# Patient Record
(demographics unavailable — no encounter records)

---

## 2022-07-27 LAB
INFLUENZA A: NOT DETECTED
INFLUENZA B: NOT DETECTED
SARS-COV-2: NOT DETECTED

## 2022-10-19 NOTE — DISCHARGE SUMMARY
ED Clinical Summary                         [facility]  [address]  [place], [place], [address] [phone]           PERSON INFORMATION  Name: Jesus Ernandez Age:  24 Years : 2001   Sex: Male Language: English PCP: PCP,  NONE   Marital Status:  Single Phone: [phone] Med Service: MED-Medicine   MRN:  [id number] Acct# [de-identified] Arrival: 2022 21:36:00   Visit Reason: Weakness; Nausea; SORE THROAT/LIGHT HEADED/POSS COVID + Acuity: 3 LOS: 000 01:17   Address:      [facility] [address] [place] [address]  Diagnosis:      Viral illness  Printed Prescriptions: Allergies      No Known Allergies      Medications Administered During Visit:        Patient Medication List:              famotidine (Pepcid 20 mg oral tablet) 1 Tabs Oral (given by mouth) 2 times a day for 7 Days. Refills: 0.  naproxen (Naprosyn 500 mg oral tablet) 1 Tabs Oral (given by mouth) 2 times a day for 7 Days. Refills: 0.  omeprazole (PriLOSEC 20 mg oral delayed release capsule) 1 Capsules Oral (given by mouth) every day. Refills: 0.  ondansetron (Zofran ODT 4 mg oral tablet, disintegrating) 1 Tabs Oral (given by mouth) 3 times a day for 3 Days. Refills: 0. Major Tests and Procedures: The following procedures and tests were performed during your ED visit. COMMONPROCEDURES%>  COMMON PROCEDURESCOMMENTS%>          Laboratory Orders  Name Status Details   [facility] Completed Nasopharyngeal Swab, Stat, ST - Stat, 22 21:42:00 EDT, 22 21:42:00 EDT, Nurse collect, MAGED Hampton, Arcelia OVIEDO, Print label Y/N               Radiology Orders  No radiology orders were placed. Patient Care Orders  Name Status Details   COVID-19 Status Ordered 22 21:42:13 EDT, NOT VALID FOR pharmacy, laboratory, radiology. , 22 21:42:13 EDT, COVID-19 Not Detected   DC ISO Order/Icons Ordered 22 22:38:37 EDT, 22 22:38:37 EDT   Discharge Patient Ordered INFORMATION  Instructions:       Viral Syndrome, No antibiotics (CUSTOM)     Follow up:                    With: Address: When:   Follow up with primary care provider  Within 1 week           ED PROVIDER DOCUMENTATION

## 2022-10-19 NOTE — ED NOTES
ED Triage Note       ED Secondary Triage Entered On:  7/27/2022 22:22 EDT    Performed On:  7/27/2022 22:22 EDT by Juhi Hoang RN, Ene Hall               General Information   Barriers to Learning :   None evident   Languages :   English   Pt. Currently Receiving Radiation :   No   ED Home Meds Section :   Document assessment   Anabel Dodge ED Fall Risk Section :   Document assessment   ED History Section :   Document assessment   ED Advance Directives Section :   Document assessment   ED Palliative Screen :   N/A (prefilled for <64yo)   Page Self RN, Flor Barrios B - 7/27/2022 22:22 EDT   (As Of: 7/27/2022 22:22:53 EDT)   Problems(Active)    Chest pain (SNOMED CT  :83721591 )  Name of Problem:   Chest pain ; Recorder:   MAGED CARDONA, JOBY HOPKINS; Confirmation:   Confirmed ; Classification:   Patient Stated ; Code:   [id number] ; Contributor System:   pickrset ; Last Updated:   3/5/2021 19:57 EST ;  Life Cycle Date:   3/5/2021 ; Life Cycle Status:   Active ; Vocabulary:   SNOMED CT          Diagnoses(Active)    Nausea  Date:   7/27/2022 ; Diagnosis Type:   Reason For Visit ; Confirmation:   Complaint of ; Clinical Dx:   Nausea ; Classification:   Medical ; Clinical Service:   Emergency medicine ; Code:   PNED ; Probability:   0 ; Diagnosis Code:   [id number]      Weakness  Date:   7/27/2022 ; Diagnosis Type:   Reason For Visit ; Confirmation:   Complaint of ; Clinical Dx:   Weakness ; Classification:   Medical ; Clinical Service:   Emergency medicine ; Code:   PNED ; Probability:   0 ; Diagnosis Code:   [id number]             -    Procedure History   (As Of: 7/27/2022 22:22:53 EDT)     Anabel Snyder 54 Fall Risk Assessment Tool   Hx of falling last 3 months ED Fall :   No   Patient confused or disoriented ED Fall :   No   Patient intoxicated or sedated ED Fall :   No   Patient impaired gait ED Fall :   No   Use a mobility assistance device ED Fall :   No   Patient altered elimination ED Fall :   No [facility] ED Fall Score :   0    Bobbi Martinez 7/27/2022 22:22 EDT   ED Advance Directive   Advance Directive :   No   Dawna Lawrence RN, Reinaldo Msesina - 7/27/2022 22:22 EDT   Social History   Social History   (As Of: 7/27/2022 22:22:53 EDT)   Tobacco:        Tobacco use: 10 or more cigarettes (1/2 pack or more)/day in last 30 days. Cigarettes   (Last Updated: 9/8/2021 15:16:35 EDT by Karen Velasco RN, Kisha)          Electronic Cigarette/Vaping:        Never Electronic Cigarette Use. (Last Updated: 7/27/2022 22:22:33 EDT by Alfreda Carlos RN, Reinaldo Messina)          Alcohol:        Current, Beer, 1-2 times per month   (Last Updated: 7/27/2022 22:22:44 EDT by Alfreda Carlos RN, Reinaldo Messina)          Substance Use:        Current, Several times per day, Marijuana   (Last Updated: 9/8/2021 15:16:48 EDT by Marcie Askew RN, Kisha)            Med Hx   Medication List   (As Of: 7/27/2022 22:22:53 EDT)   Prescription/Discharge Order    famotidine  :   famotidine ; Status:   Prescribed ; Ordered As Mnemonic:   Pepcid 20 mg oral tablet ; Simple Display Line:   20 mg, 1 tabs, Oral, BID, for 7 days, 14 tabs, 0 Refill(s) ; Ordering Provider: Demetris PULLIAM; Catalog Code:   famotidine ; Order Dt/Tm:   1/24/2022 01:10:33 EST          omeprazole  :   omeprazole ; Status:   Prescribed ; Ordered As Mnemonic:   PriLOSEC 20 mg oral delayed release capsule ; Simple Display Line:   20 mg, 1 caps, Oral, Daily, 30 caps, 0 Refill(s) ; Ordering Provider:    Demetris PULLIAM; Catalog Code:   omeprazole ; Order Dt/Tm:   1/24/2022 01:10:23 EST

## 2022-10-19 NOTE — ED NOTES
ED Patient Education Note     Patient Education Materials Follows:          Viral Infection  A viral infection is an illness that affects various parts of the body  It is caused by a germ called a virus. Some examples of this kind of infection are:     A cold.  The flu (influenza).  A respiratory syncytial virus (RSV) infection.  Gastroenteritis   Pharyngitis  HOW DO I KNOW IF I HAVE THIS INFECTION? Most of the time this infection causes:   A stuffy or runny nose.  Yellow or green fluid in the nose.  A cough.  Sneezing.  Tiredness (fatigue).  Achy muscles.  A sore throat.  Sweating or chills.  A fever.  A headache.  Nausea, vomiting, or diarrhea. HOW IS THIS INFECTION TREATED? If the virus is identified as the flu, and the flu is diagnosed early, it may be treated with an antiviral medicine. This medicine shortens the length of time a person has symptoms. Otherwise, there is no direct treatment for viral infections. Medicines may be given to treat the symptoms. Symptoms may also be treated with over-the-counter and prescription medicines, such as:   Expectorants. These make it easier to cough up mucus.  Decongestant nasal sprays. Doctors should not prescribe antibiotic medicines for viral infections. They do not work with this kind of infection. HOW DO I KNOW IF I SHOULD STAY HOME? To keep others from getting sick, stay home if you have:   A fever.  A lasting cough.  A sore throat.  A runny nose.  Sneezing.  Muscles aches.  Headaches.  Tiredness.  Weakness.  Chills.  Sweating.  An upset stomach (nausea). HOME CARE   Rest as much as possible.  Take over-the-counter and prescription medicines only as told by your doctor.  Drink enough fluid to keep your pee (urine) clear or pale yellow.  If the throat is sore gargle with salt water. Do this 34 times per day or as needed.  To make a saltwater mixture, dissolve ½1 tsp of salt in 1 cup of warm water. Make sure the salt dissolves all the way.  Use nose drops made from salt water. This helps with stuffiness (congestion). It also helps soften the skin around your nose.  Do not drink alcohol.  Do not use tobacco products, including cigarettes, chewing tobacco, and e-cigarettes. If you need help quitting, ask your doctor. GET HELP IF:   Your symptoms last for 10 days or longer.  Your symptoms get worse over time.  You have a fever.  You have very bad pain in your face or forehead.  Parts of your jaw or neck become very swollen. GET HELP RIGHT AWAY IF:   You feel pain or pressure in your chest.   You have shortness of breath.  You faint or feel like you will faint.  You keep throwing up (vomiting).  You feel confused. This information is not intended to replace advice given to you by your health care provider. Make sure you discuss any questions you have with your health care provider.   Document Released: 11/30/2009 Document Revised: 11/28/2016 Document Reviewed: 05/25/2016  Elsevier Interactive Patient Education [facility]

## 2022-10-19 NOTE — ED NOTES
ED Triage Note       ED Triage Adult Entered On:  7/27/2022 21:41 EDT    Performed On:  7/27/2022 21:38 EDT by Ben Anderson RN, Tiago GRIFFIN               Triage   Numeric Rating Pain Scale :   0 = No pain   Chief Complaint :   Pt ambulatory to ED for Covid test. Pt states that he is lightheaded, nausea, fatigue, headache x 3 days. last dose ibuprofen this am    [place] [facility] Mode of Arrival :   Private vehicle   Infectious Disease Documentation :   Document assessment   Patient received chemo or biotherapy last 48 hrs? :   No   Temperature Oral :   37.0 degC(Converted to: 98.6 degF)    Heart Rate Monitored :   78 bpm   Respiratory Rate :   18 br/min   Systolic Blood Pressure :   468 mmHg   Diastolic Blood Pressure :   83 mmHg   SpO2 :   98 %   Oxygen Therapy :   Room air   Patient presentation :   None of the above   Chief Complaint or Presentation suggest infection :   No   Dosing Weight Obtained By :   Patient stated   Weight Dosing :   83 kg(Converted to: 183 lb 0 oz)    Height :   180 cm(Converted to: 5 ft 11 in)    Body Mass Index Dosing :   26 kg/m2   Radha GRIFFIN - 7/27/2022 21:38 EDT   DCP GENERIC CODE   Tracking Acuity :   3   Tracking Group :   ED [facility]   Ben Anderson, [place] - 7/27/2022 21:38 EDT   ED General Section :   Document assessment   Pregnancy Status :   N/A   ED Allergies Section :   Document assessment   ED Reason for Visit Section :   Document assessment   ED Quick Assessment :   Patient appears awake, alert, oriented to baseline. Skin warm and dry. Moves all extremities. Respiration even and unlabored. Appears in no apparent distress.    Loyda Madsen RN, Tiago GRIFFIN - 7/27/2022 21:38 EDT   ID Risk Screen Symptoms   Recent Travel History :   No recent travel   Last 14 days COVID-19 ID :   No   Radha GRIFFIN - 7/27/2022 21:38 EDT   Allergies   (As Of: 7/27/2022 21:41:19 EDT)   Allergies (Active)   No Known Allergies  Estimated Onset Date:   Unspecified ; Created By:   Bipin Yarbrough RN, Darren Edmonds; Reaction Status:   Active ; Category:   Drug ; Substance:   No Known Allergies ; Type: Allergy ; Updated By:   Leatha Blevins; Reviewed Date:   7/27/2022 21:39 EDT        Psycho-Social   Last 3 mo, thoughts killing self/others :   Patient denies   Right click within box for Suspected Abuse policy link. :   None   Feels Safe Where Live :   Yes   ED Behavioral Activity Rating Scale :   4 - Quiet and awake (normal level of activity)   Solitario Peguero - 7/27/2022 21:38 EDT   ED Reason for Visit   (As Of: 7/27/2022 21:41:19 EDT)   Problems(Active)    Chest pain (SNOMED CT  :30601949 )  Name of Problem:   Chest pain ; Recorder:   MAGED CARDONA NANCY A; Confirmation:   Confirmed ; Classification:   Patient Stated ; Code:   [id number] ; Contributor System:   [employer] ; Last Updated:   3/5/2021 19:57 EST ;  Life Cycle Date:   3/5/2021 ; Life Cycle Status:   Active ; Vocabulary:   SNOMED CT          Diagnoses(Active)    Nausea  Date:   7/27/2022 ; Diagnosis Type:   Reason For Visit ; Confirmation:   Complaint of ; Clinical Dx:   Nausea ; Classification:   Medical ; Clinical Service:   Emergency medicine ; Code:   PNED ; Probability:   0 ; Diagnosis Code:   [id number]      Weakness  Date:   7/27/2022 ; Diagnosis Type:   Reason For Visit ; Confirmation:   Complaint of ; Clinical Dx:   Weakness ; Classification:   Medical ; Clinical Service:   Emergency medicine ; Code:   PNED ; Probability:   0 ; Diagnosis Code:   [id number]

## 2022-10-19 NOTE — ED PROVIDER NOTES
General Medical Problem *ED        Patient:   Lotus Nicole             MRN: [id number]            FIN: [id number]               Age:   24 years     Sex:  Male     :  2001   Associated Diagnoses:   Viral illness   Author:   Yuli BULL      Basic Information   Time seen: Provider Seen (ST)   ED Provider/Time:    Yuli BULL / 2022 21:59  . Additional information: Chief Complaint from Nursing Triage Note   Chief Complaint  Chief Complaint: Pt ambulatory to ED for Covid test. Pt states that he is lightheaded, nausea, fatigue, headache x 3 days. last dose ibuprofen this am (22 21:38:00). History of Present Illness   The patient presents with 66-year-old healthy male presents here for 3 days of mild to moderate headache, nausea. He has had a mild cough with no production. Does not feel short of breath. He has no sore throat. No real congestion. No abdominal pain. Patient requesting COVID-19 test.  Took some ibuprofen this morning for his headache and states that that made it better. Denies any current headache or nausea at this time. .        Review of Systems   Constitutional symptoms:  No fever, no chills, no sweats, no generalized weakness, no fatigue. Skin symptoms:  No rash, no pruritus. Eye symptoms:  Vision unchanged, no pain, no discharge. ENMT symptoms:  No sore throat, no nasal congestion. Respiratory symptoms:  Cough, no shortness of breath, no sputum production. Cardiovascular symptoms:  No chest pain, no peripheral edema. Gastrointestinal symptoms:  Nausea, no abdominal pain, no vomiting, no diarrhea. Musculoskeletal symptoms:  No back pain, no Muscle pain. Neurologic symptoms:  Headache, No dizziness,    Allergy/immunologic symptoms:  No recurrent infections, no impaired immunity. Health Status   Allergies: Allergic Reactions (Selected)  No Known Allergies.       Past Medical/ Family/ Social History   Surgical history: 07/27/2022 11:10 PM EDT

## 2022-10-19 NOTE — ED NOTES
ED Patient Summary                 [facility]  [address], [place], [address] [place] [address]  [phone]  Discharge Instructions (Patient)  Bipin Rico  :  2001                   MRN: [id number]                   FIN: [id number]  Reason For Visit: Weakness; Nausea; SORE THROAT/LIGHT HEADED/POSS COVID +  Final Diagnosis: Viral illness     Visit Date: 2022 21:36:00  Address: [address] [place] [address]  Phone: [phone]     Emergency Department Providers:         Primary Physician:      Donnie Singleton would like to thank you for allowing us to assist you with your healthcare needs. The following includes patient education materials and information regarding your injury/illness. Follow-up Instructions: You were seen today on an emergency basis. Please contact your primary care doctor for a follow up appointment. If you received a referral to a specialist doctor, it is important you follow-up as instructed. It is important that you call your follow-up doctor to schedule and confirm the location of your next appointment. Your doctor may practice at multiple locations. The office location of your follow-up appointment may be different to the one written on your discharge instructions. If you do not have a primary care doctor, please call [phone] for help in finding a Selene Oliveira. [facility] Provider. For help in finding a specialist doctor, please call [phone]. If your condition gets worse before your follow-up with your primary care doctor or specialist, please return to the Emergency Department. Coronavirus 2019 (COVID-19) Reminders:     Patients age 15 - 24, with parental consent, and over age 25 can make an appointment for a COVID-19 vaccine. Patients can contact their [address] [place] [address] [facility] doctors' offices to schedule an appointment to receive the COVID-19 vaccine. Patients who do not have a [facility] physician can call [phone] to schedule vaccination appointments. Follow Up Appointments:  Primary Care Provider:     Name: PCP,  NONE     Phone:                  With: Address: When:   Follow up with primary care provider  Within 1 week              [address] SERVICES%>             New Medications  Printed Prescriptions  naproxen (Naprosyn 500 mg oral tablet) 1 Tabs Oral (given by mouth) 2 times a day for 7 Days. Refills: 0. Last Dose:____________________  ondansetron (Zofran ODT 4 mg oral tablet, disintegrating) 1 Tabs Oral (given by mouth) 3 times a day for 3 Days. Refills: 0. Last Dose:____________________  Medications that have not changed  Other Medications  famotidine (Pepcid 20 mg oral tablet) 1 Tabs Oral (given by mouth) 2 times a day for 7 Days. Refills: 0. Last Dose:____________________  omeprazole (PriLOSEC 20 mg oral delayed release capsule) 1 Capsules Oral (given by mouth) every day. Refills: 0. Last Dose:____________________      Allergy Info: No Known Allergies     Discharge Additional Information          Discharge Patient 07/27/22 22:41:00 EDT      Patient Education Materials:             Viral Infection  A viral infection is an illness that affects various parts of the body  It is caused by a germ called a virus. Some examples of this kind of infection are:     A cold.  The flu (influenza).  A respiratory syncytial virus (RSV) infection.  Gastroenteritis   Pharyngitis  HOW DO I KNOW IF I HAVE THIS INFECTION? Most of the time this infection causes:   A stuffy or runny nose.  Yellow or green fluid in the nose.  A cough.  Sneezing.  Tiredness (fatigue).  Achy muscles.  A sore throat.  Sweating or chills.  A fever.  A headache.  Nausea, vomiting, or diarrhea. HOW IS THIS INFECTION TREATED?   If the virus is identified as the flu, and the flu is diagnosed early, it may be treated with an to you by your health care provider. Make sure you discuss any questions you have with your health care provider. Document Released: 11/30/2009 Document Revised: 11/28/2016 Document Reviewed: 05/25/2016  [employer] Interactive Patient Education [facility]          ---------------------------------------------------------------------------------------------------------------------  [facility] allows patients to review your COVID and other test results as well as discharge documents from any [facility], Emergency Department, surgical center or outpatient lab. Test results are typically available 36 hours after the test is completed. [address] encourages you to self-enroll in the [facility] Patient Portal.     To begin your self-enrollment process, please visit [email or web address] Under [facility], click on Sign up now. NOTE: You must be 16 years and older to use [facility] Self-Enroll online. If you are a parent, caregiver, or guardian; you need an invite to access your childs or dependents health records. To obtain an invite, contact the Medical Records department at [phone] Monday through Friday, 8-4:30, select option 3 . If we receive your call afterhours, we will return your call the next business day. If you have issues trying to create or access your account, contact [employer] at [phone] available 7 days a week 24 hours a day.      Comment: